# Patient Record
Sex: FEMALE | Race: WHITE | Employment: FULL TIME | URBAN - METROPOLITAN AREA
[De-identification: names, ages, dates, MRNs, and addresses within clinical notes are randomized per-mention and may not be internally consistent; named-entity substitution may affect disease eponyms.]

---

## 2020-09-06 ENCOUNTER — HOSPITAL ENCOUNTER (EMERGENCY)
Facility: CLINIC | Age: 65
Discharge: HOME | End: 2020-09-06
Admitting: EMERGENCY MEDICINE
Payer: COMMERCIAL

## 2020-09-06 VITALS
TEMPERATURE: 98.5 F | OXYGEN SATURATION: 96 % | HEIGHT: 64 IN | RESPIRATION RATE: 16 BRPM | BODY MASS INDEX: 33.5 KG/M2 | WEIGHT: 196.21 LBS | SYSTOLIC BLOOD PRESSURE: 141 MMHG | HEART RATE: 77 BPM | DIASTOLIC BLOOD PRESSURE: 71 MMHG

## 2020-09-06 DIAGNOSIS — R23.3 PETECHIAL RASH: ICD-10-CM

## 2020-09-06 LAB
ANION GAP SERPL CALCULATED.3IONS-SCNC: 4 MMOL/L (ref 3–14)
BUN SERPL-MCNC: 16 MG/DL (ref 7–30)
CALCIUM SERPL-MCNC: 9.4 MG/DL (ref 8.5–10.1)
CHLORIDE SERPL-SCNC: 109 MMOL/L (ref 94–109)
CO2 SERPL-SCNC: 27 MMOL/L (ref 20–32)
CREAT SERPL-MCNC: 0.85 MG/DL (ref 0.52–1.04)
ERYTHROCYTE [DISTWIDTH] IN BLOOD BY AUTOMATED COUNT: 13 % (ref 10–15)
GFR SERPL CREATININE-BSD FRML MDRD: 73 ML/MIN/{1.73_M2}
GLUCOSE SERPL-MCNC: 99 MG/DL (ref 70–99)
HCT VFR BLD AUTO: 43 % (ref 35–47)
HGB BLD-MCNC: 13.4 G/DL (ref 11.7–15.7)
MCH RBC QN AUTO: 28.2 PG (ref 26.5–33)
MCHC RBC AUTO-ENTMCNC: 31.2 G/DL (ref 31.5–36.5)
MCV RBC AUTO: 90 FL (ref 78–100)
PLATELET # BLD AUTO: 280 10E9/L (ref 150–450)
POTASSIUM SERPL-SCNC: 3.8 MMOL/L (ref 3.4–5.3)
RBC # BLD AUTO: 4.76 10E12/L (ref 3.8–5.2)
SODIUM SERPL-SCNC: 140 MMOL/L (ref 133–144)
WBC # BLD AUTO: 8.6 10E9/L (ref 4–11)

## 2020-09-06 PROCEDURE — 99283 EMERGENCY DEPT VISIT LOW MDM: CPT

## 2020-09-06 PROCEDURE — 80048 BASIC METABOLIC PNL TOTAL CA: CPT | Performed by: EMERGENCY MEDICINE

## 2020-09-06 PROCEDURE — 85027 COMPLETE CBC AUTOMATED: CPT | Performed by: EMERGENCY MEDICINE

## 2020-09-06 ASSESSMENT — MIFFLIN-ST. JEOR: SCORE: 1430

## 2020-09-06 ASSESSMENT — ENCOUNTER SYMPTOMS
CHILLS: 0
FEVER: 0
COLOR CHANGE: 1

## 2020-09-06 NOTE — ED TRIAGE NOTES
"Pt was started don Keflex for cellulitis in ankles.  She states \"overdid it\" and area involved has increased in coverage.  "

## 2020-09-06 NOTE — ED PROVIDER NOTES
"History     Chief Complaint:  Wound Check     The history is provided by the patient.     Colleen Thayer is a 64 year old female with a history of eczema and cellulitis who presents for evaluation of cellulitis of her bilateral lower extremities, left greater than right. The patient presented to Urgent Care six days ago where she was treated with Keflex for bilateral lower leg cellulitis. Since, she has taken Keflex as prescribed. She does note that the appearance of her cellulitis has improved within the past six days, but has redness of her lower legs which encouraged her to present today.     Here, she denies any fever, chills, or other erythema. 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 She also wore jeans for the first time in a while yesterday, but typically wears socks. She denies any known tick bites. The patient has had three previous episodes of cellulitis, which typically include foot swelling and redness. She denies any known history of autoimmune diseases.     Allergies:  No Known Drug Allergies    Medications:   Keflex  Aspirin 81 mg     Medical History:   Eczema  Cellulitis  Heart Murmur    Surgical History   History reviewed. No pertinent past surgical history.     Family History:   History reviewed. No pertinent family history.       Social History:  The patient was unaccompanied to the ED.    Review of Systems   Constitutional: Negative for chills and fever.   Skin: Positive for color change (erythema of bilateral lower legs).   All other systems reviewed and are negative.    Physical Exam     Patient Vitals for the past 24 hrs:   BP Temp Temp src Pulse Resp SpO2 Height Weight   09/06/20 1338 (!) 167/76 98.5  F (36.9  C) Oral 84 16 98 % 1.626 m (5' 4\") 89 kg (196 lb 3.4 oz)      Physical Exam  General:   Well-nourished   Speaking in full sentences   Eyes:   Conjunctiva without injection or scleral icterus   ENT:   Moist " mucous membranes   Nares patent   Pinnae normal   Neck:   Full ROM   No stiffness appreciated   Resp:   Lungs CTAB   No crackles, wheezing or audible rubs   Good air movement   CV:   Normal rate, regular rhythm   S1 and S2 present   No murmur, gallop or rub   GI:   BS present   Abdomen soft without distention   Non-tender to light and deep palpation   No guarding or rebound tenderness   Skin:   Warm, dry, well perfused   RLE:   Region of non-blanching petechial rash to lower tib/fib region   Inferior border is distinctly demarcated in a line consistent with sock line or prior ACE bandage use   LLE:   Non-blanching petechial rash noted to anterior/medial aspect of left lower extremity   Legs are non-tender to palpation   2+ DP pulse   MSK:   Moves all extremities   No focal deformities or swelling   Neuro:   Alert   Answers questions appropriately   Moves all extremities equally   Gait stable   Psych:   Normal affect, normal mood     Emergency Department Course     Laboratory:  Laboratory findings were communicated with the patient who voiced understanding of the findings.    CBC: WBC 8.6, HGB 13.4,   BMP:  (Creatinine 0.85) [facility]     Emergency Department Course:    1341 Nursing notes and vitals reviewed.    1353 I performed an exam of the patient as documented above.     1421 IV was inserted and blood was drawn for laboratory testing, results above.    Patient rechecked and updated.      1448 Findings and plan explained to the Patient. Patient discharged home with instructions regarding supportive care, medications, and reasons to return. The importance of close follow-up was reviewed.      Impression & Plan     Medical Decision Making:  Colleen Thayer is a very pleasant 64-year-old female presenting to the ED for evaluation of a wound check. VS on presentation reveal elevated BP although otherwise are unremarkable. Examination is notable for a fine, localized petechial rash involving her bilateral lower  extremities. By history, she describes more pronounced erythema with circumferential swelling and tenderness to her left lower extremity, which certainly is suspicious for cellulitis. At this time, her exam does not reveal findings consistent with ongoing cellulitis.  She exhibits a faint petechial rash, which could be secondary to localized trauma given the linear demarcation in a region consistent with Ace bandage application, though other etiologies include vasculitis, autoimmune pathology, DIC, among others.  Screening laboratory studies reveal unremarkable platelet count as well as metabolic function.  She was initially prescribed a 10-day course of Keflex, though given her significant improvements in symptoms, do feel it would be reasonable to stop this after 7 days.  We discussed the broad differential for her current rash.  Given the absence of fevers, or other constitutional symptoms, doubt systemic infection, or arthropod borne disease.  I have suggested close follow-up with primary care provider in 2 to 3 days for reevaluation.  Also suggested follow-up with dermatology as she has had multiple episodes of this similar with subsequent hemosiderin deposition to her lower extremities which appears chronic.  She may benefit from further evaluation with biopsy of the tissue for further delineation.  Return to ER with worsened rash, pain, swelling or any other concerns.  All questions answered prior to discharge.    Diagnosis:     ICD-10-CM    1. Petechial rash  R23.3         Disposition:  Discharged to home.    Scribe Disclosure:  Hazel AN, am serving as a scribe at 1:42 PM on 9/6/2020 to document services personally performed by Jose Raul Cabrera MD based on my observations and the provider's statements to me.      Jose Raul Cabrera MD  09/07/20 0804

## 2020-09-06 NOTE — DISCHARGE INSTRUCTIONS
Please follow-up with your primary care provider in 3 days for reevaluation.    Your current rash is less likely related to cellulitis, though may represent an underlying inflammatory process involving the blood vessels.    He may benefit from a biopsy with dermatology for further evaluation, particularly if symptoms persist.    Please seek reevaluation if you develop worsening rash, fevers, joint pains, myalgias, or any other concerns.